# Patient Record
Sex: FEMALE | Race: WHITE
[De-identification: names, ages, dates, MRNs, and addresses within clinical notes are randomized per-mention and may not be internally consistent; named-entity substitution may affect disease eponyms.]

---

## 2017-09-29 ENCOUNTER — HOSPITAL ENCOUNTER (OUTPATIENT)
Dept: HOSPITAL 75 - REHAB | Age: 77
LOS: 1 days | Discharge: HOME | End: 2017-09-30
Attending: INTERNAL MEDICINE
Payer: MEDICARE

## 2017-09-29 DIAGNOSIS — M15.9: Primary | ICD-10-CM

## 2017-10-24 ENCOUNTER — HOSPITAL ENCOUNTER (OUTPATIENT)
Dept: HOSPITAL 75 - REHAB | Age: 77
LOS: 35 days | Discharge: HOME | End: 2017-11-28
Attending: INTERNAL MEDICINE
Payer: MEDICARE

## 2017-10-24 DIAGNOSIS — M15.9: Primary | ICD-10-CM

## 2021-03-10 ENCOUNTER — HOSPITAL ENCOUNTER (OUTPATIENT)
Dept: HOSPITAL 75 - CARD | Age: 81
End: 2021-03-10
Attending: NURSE PRACTITIONER
Payer: MEDICARE

## 2021-03-10 DIAGNOSIS — R00.2: Primary | ICD-10-CM

## 2021-03-10 PROCEDURE — 93005 ELECTROCARDIOGRAM TRACING: CPT

## 2021-04-14 ENCOUNTER — HOSPITAL ENCOUNTER (OUTPATIENT)
Dept: HOSPITAL 75 - LABNPT | Age: 81
End: 2021-04-14
Attending: INTERNAL MEDICINE
Payer: MEDICARE

## 2021-04-14 DIAGNOSIS — Z20.822: Primary | ICD-10-CM

## 2021-04-14 PROCEDURE — 87635 SARS-COV-2 COVID-19 AMP PRB: CPT

## 2021-06-25 ENCOUNTER — HOSPITAL ENCOUNTER (OUTPATIENT)
Dept: HOSPITAL 75 - SLEEP | Age: 81
End: 2021-06-25
Attending: NURSE PRACTITIONER
Payer: MEDICARE

## 2021-06-25 DIAGNOSIS — Z20.822: ICD-10-CM

## 2021-06-25 DIAGNOSIS — I49.9: ICD-10-CM

## 2021-06-25 DIAGNOSIS — G47.10: ICD-10-CM

## 2021-06-25 DIAGNOSIS — G47.33: Primary | ICD-10-CM

## 2021-06-25 PROCEDURE — 87635 SARS-COV-2 COVID-19 AMP PRB: CPT

## 2022-02-07 ENCOUNTER — HOSPITAL ENCOUNTER (OUTPATIENT)
Dept: HOSPITAL 75 - ENDO | Age: 82
Discharge: HOME | End: 2022-02-07
Attending: INTERNAL MEDICINE
Payer: MEDICARE

## 2022-02-07 VITALS — DIASTOLIC BLOOD PRESSURE: 60 MMHG | SYSTOLIC BLOOD PRESSURE: 127 MMHG

## 2022-02-07 VITALS — HEIGHT: 62.99 IN | WEIGHT: 140.21 LBS | BODY MASS INDEX: 24.84 KG/M2

## 2022-02-07 VITALS — SYSTOLIC BLOOD PRESSURE: 141 MMHG | DIASTOLIC BLOOD PRESSURE: 68 MMHG

## 2022-02-07 VITALS — DIASTOLIC BLOOD PRESSURE: 78 MMHG | SYSTOLIC BLOOD PRESSURE: 166 MMHG

## 2022-02-07 VITALS — SYSTOLIC BLOOD PRESSURE: 167 MMHG | DIASTOLIC BLOOD PRESSURE: 67 MMHG

## 2022-02-07 VITALS — DIASTOLIC BLOOD PRESSURE: 60 MMHG | SYSTOLIC BLOOD PRESSURE: 129 MMHG

## 2022-02-07 DIAGNOSIS — Z79.899: ICD-10-CM

## 2022-02-07 DIAGNOSIS — K31.89: ICD-10-CM

## 2022-02-07 DIAGNOSIS — K22.2: ICD-10-CM

## 2022-02-07 DIAGNOSIS — K21.00: Primary | ICD-10-CM

## 2022-02-07 DIAGNOSIS — Z20.822: ICD-10-CM

## 2022-02-07 DIAGNOSIS — E78.5: ICD-10-CM

## 2022-02-07 DIAGNOSIS — K44.9: ICD-10-CM

## 2022-02-07 PROCEDURE — 88305 TISSUE EXAM BY PATHOLOGIST: CPT

## 2022-02-07 PROCEDURE — 87636 SARSCOV2 & INF A&B AMP PRB: CPT

## 2022-02-07 NOTE — ANESTHESIA-GENERAL POST-OP
MAC


Patient Condition


Mental Status/LOC:  Same as Preop


Cardiovascular:  Satisfactory


Nausea/Vomiting:  Absent


Respiratory:  Satisfactory


Pain:  Controlled


Complications:  Absent





Post Op Complications


Complications


None





Follow Up Care/Instructions


Patient Instructions


None needed.





Anesthesiology Discharge Order


Discharge Order


Patient is doing well, no complaints, stable vital signs, no apparent adverse 

anesthesia problems.   


No complications reported per nursing.











EL BELL CRNA               Feb 7, 2022 16:20

## 2022-02-08 NOTE — OPERATIVE REPORT
DATE OF SERVICE:  02/07/2022



EGD SUMMARY



INDICATION FOR THE PROCEDURE:

EGD was performed for evaluation of dysphagia.



DESCRIPTION OF PROCEDURE:

The patient was placed in the left lateral decubitus position.  The endoscope

was inserted in the oral cavity and under direct visualization, esophagus was

intubated.  Endoscope was passed down the esophagus through stomach and second

portion of the duodenum.  Careful inspection was made as the endoscope was

withdrawn.



FINDINGS:

The posterior pharynx, arytenoid aperture, epiglottis, and true and false vocal

folds were unremarkable to digital inspection.  Proximal and mid esophagus were

unremarkable.  There is some tortuosity of the distal esophagus.  There is a

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

ulceration.  Just proximal to this, there did appear to be some increased tone

of the lower esophagus, especially in light of underlying Diprivan-based

anesthesia.  A small hiatal hernia was present.  The cardia and fundus were

unremarkable.  There is some mild antral erythema without evidence for erosion

or ulceration.  The pylorus, the pyloric channel, the duodenal bulb and second

portion of duodenum were unremarkable.



ASSESSMENT AND PLAN:

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  The
patient

subsequently underwent balloon dilatation using the 20 mm balloon dilators. 

This did notably increase the circumference of the distal esophagus.  Biopsies

from the GE junction were obtained.  There was some striation in the

distal esophagus, so a biopsy was obtained and submitted for histopathology

evaluation, specifically for evaluation for eosinophilic esophagitis.  Advised

continued liquid diet for the next 24 hours and then advancing to soft solids. 

I did advise proton pump inhibitor therapy for now and we will initiate

omeprazole 20 mg q.a.m. daily.  Advised follow up with Doris in several weeks

to monitor progress.  I did discuss the likelihood of recurrence as has been the

case in the past with a need for repeat dilatation for onset of dysphagia in the
future.





Job ID: [id number]

DocumentID: [id number]

Dictated Date:  02/07/2022 16:31:49

Transcription Date: 02/08/2022 00:58:47

Dictated By: SILVER BURNETT MD

[facility]

## 2022-03-29 ENCOUNTER — HOSPITAL ENCOUNTER (OUTPATIENT)
Dept: HOSPITAL 75 - PREOP | Age: 82
End: 2022-03-29
Attending: OTOLARYNGOLOGY
Payer: MEDICARE

## 2022-03-29 VITALS — BODY MASS INDEX: 23.11 KG/M2 | WEIGHT: 135.36 LBS | HEIGHT: 64.02 IN

## 2022-03-29 DIAGNOSIS — Z01.810: Primary | ICD-10-CM

## 2022-03-29 DIAGNOSIS — Z01.812: ICD-10-CM

## 2022-03-29 DIAGNOSIS — K13.0: ICD-10-CM

## 2022-03-29 LAB
BUN/CREAT SERPL: 15
CALCIUM SERPL-MCNC: 9.3 MG/DL (ref 8.5–10.1)
CHLORIDE SERPL-SCNC: 105 MMOL/L (ref 98–107)
CO2 SERPL-SCNC: 30 MMOL/L (ref 21–32)
CREAT SERPL-MCNC: 0.75 MG/DL (ref 0.6–1.3)
GFR SERPLBLD BASED ON 1.73 SQ M-ARVRAT: 79 ML/MIN
GLUCOSE SERPL-MCNC: 90 MG/DL (ref 70–105)
HCT VFR BLD CALC: 43 % (ref 35–52)
HGB BLD-MCNC: 14.2 G/DL (ref 11.5–16)
MCH RBC QN AUTO: 31 PG (ref 25–34)
MCHC RBC AUTO-ENTMCNC: 33 G/DL (ref 32–36)
MCV RBC AUTO: 95 FL (ref 80–99)
PLATELET # BLD: 203 10^3/UL (ref 130–400)
PMV BLD AUTO: 11.4 FL (ref 9–12.2)
POTASSIUM SERPL-SCNC: 3.9 MMOL/L (ref 3.6–5)
SODIUM SERPL-SCNC: 142 MMOL/L (ref 135–145)
WBC # BLD AUTO: 6.3 10^3/UL (ref 4.3–11)

## 2022-03-29 PROCEDURE — 93005 ELECTROCARDIOGRAM TRACING: CPT

## 2022-03-29 PROCEDURE — 36415 COLL VENOUS BLD VENIPUNCTURE: CPT

## 2022-03-29 PROCEDURE — 71046 X-RAY EXAM CHEST 2 VIEWS: CPT

## 2022-03-29 PROCEDURE — 85027 COMPLETE CBC AUTOMATED: CPT

## 2022-03-29 PROCEDURE — 87081 CULTURE SCREEN ONLY: CPT

## 2022-03-29 PROCEDURE — 80048 BASIC METABOLIC PNL TOTAL CA: CPT

## 2022-03-29 NOTE — DIAGNOSTIC IMAGING REPORT
INDICATION: 

Preop for left hip surgery.



TIME OF EXAM: 

2:18 PM.



COMPARISON: 

No prior studies are available for comparison.



FINDINGS: 

The heart size is normal. The pulmonary vascularity is

unremarkable. The lungs are clear. No infiltrate, effusion, or

pneumothorax is detected.



IMPRESSION: 

No acute cardiopulmonary process is detected.



Dictated by: 



  Dictated on workstation # [id number]

## 2022-03-31 ENCOUNTER — HOSPITAL ENCOUNTER (OUTPATIENT)
Dept: HOSPITAL 75 - SDC | Age: 82
Discharge: HOME | End: 2022-03-31
Attending: OTOLARYNGOLOGY
Payer: MEDICARE

## 2022-03-31 VITALS — SYSTOLIC BLOOD PRESSURE: 123 MMHG | DIASTOLIC BLOOD PRESSURE: 57 MMHG

## 2022-03-31 VITALS — DIASTOLIC BLOOD PRESSURE: 53 MMHG | SYSTOLIC BLOOD PRESSURE: 124 MMHG

## 2022-03-31 VITALS — DIASTOLIC BLOOD PRESSURE: 50 MMHG | SYSTOLIC BLOOD PRESSURE: 93 MMHG

## 2022-03-31 VITALS — SYSTOLIC BLOOD PRESSURE: 106 MMHG | DIASTOLIC BLOOD PRESSURE: 57 MMHG

## 2022-03-31 VITALS — HEIGHT: 63.78 IN | WEIGHT: 135.36 LBS | BODY MASS INDEX: 23.4 KG/M2

## 2022-03-31 VITALS — DIASTOLIC BLOOD PRESSURE: 55 MMHG | SYSTOLIC BLOOD PRESSURE: 103 MMHG

## 2022-03-31 VITALS — DIASTOLIC BLOOD PRESSURE: 70 MMHG | SYSTOLIC BLOOD PRESSURE: 148 MMHG

## 2022-03-31 VITALS — DIASTOLIC BLOOD PRESSURE: 68 MMHG | SYSTOLIC BLOOD PRESSURE: 144 MMHG

## 2022-03-31 VITALS — SYSTOLIC BLOOD PRESSURE: 140 MMHG | DIASTOLIC BLOOD PRESSURE: 82 MMHG

## 2022-03-31 VITALS — DIASTOLIC BLOOD PRESSURE: 89 MMHG | SYSTOLIC BLOOD PRESSURE: 136 MMHG

## 2022-03-31 DIAGNOSIS — D23.0: Primary | ICD-10-CM

## 2022-03-31 DIAGNOSIS — Z79.899: ICD-10-CM

## 2022-03-31 DIAGNOSIS — I45.89: ICD-10-CM

## 2022-03-31 DIAGNOSIS — K21.9: ICD-10-CM

## 2022-03-31 PROCEDURE — 87081 CULTURE SCREEN ONLY: CPT

## 2022-03-31 PROCEDURE — 93005 ELECTROCARDIOGRAM TRACING: CPT

## 2022-03-31 RX ADMIN — SODIUM CHLORIDE, SODIUM LACTATE, POTASSIUM CHLORIDE, AND CALCIUM CHLORIDE PRN MLS/HR: 600; 310; 30; 20 INJECTION, SOLUTION INTRAVENOUS at 06:35

## 2022-03-31 RX ADMIN — SODIUM CHLORIDE, SODIUM LACTATE, POTASSIUM CHLORIDE, AND CALCIUM CHLORIDE PRN MLS/HR: 600; 310; 30; 20 INJECTION, SOLUTION INTRAVENOUS at 08:35

## 2022-03-31 NOTE — PROGRESS NOTE-POST OPERATIVE
Post-Operative Progess Note


Surgeon (s)/Assistant (s)


Surgeon


VALDEMAR GUILLORY MD


Assistant


n/a





Pre-Operative Diagnosis


LesftLower Lip Lesion





Post-Operative Diagnosis


same





Post-Op Procedure Note


Date of Procedure:  Mar 31, 2022


Name of Procedure Performed:  


Excisoion of Left Loer Lip Leisoin with Repair


Description & Findings


Description and Findings:





n/a


Anesthesia Type


get


Estimated Blood Loss


minimal


Packing


none.


Specimen(s) collected/removed


left lower lip lesion











VALDEMAR GUILLORY MD             Mar 31, 2022 07:10

## 2022-03-31 NOTE — ANESTHESIA-GENERAL POST-OP
General


Patient Condition


Mental Status/LOC:  Same as Preop


Cardiovascular:  Satisfactory


Nausea/Vomiting:  Absent


Respiratory:  Satisfactory


Pain:  Controlled


Complications:  Absent





Post Op Complications


Complications


None





Follow Up Care/Instructions


Patient Instructions


None needed.





Anesthesia/Patient Condition


Patient Condition


Patient is doing well, no complaints, stable vital signs, no apparent adverse 

anesthesia problems.   


No complications reported per nursing.











SANDRO WORTHY CRNA          Mar 31, 2022 09:42

## 2022-03-31 NOTE — PROGRESS NOTE-PRE OPERATIVE
Pre-Operative Progress Note


H&P Reviewed


The H&P was reviewed, patient examined and no changes noted.


Date Seen by Provider:  Mar 31, 2022


Time Seen by Provider:  06:30


Date H&P Reviewed:  Mar 31, 2022


Time H&P Reviewed:  06:30


Pre-Operative Diagnosis:  LesftLower Lip Lesion











VALDEMAR GUILLORY MD             Mar 31, 2022 07:07

## 2023-05-26 ENCOUNTER — HOSPITAL ENCOUNTER (OUTPATIENT)
Dept: HOSPITAL 75 - CARD | Age: 83
End: 2023-05-26
Attending: INTERNAL MEDICINE
Payer: MEDICARE

## 2023-05-26 DIAGNOSIS — I10: Primary | ICD-10-CM

## 2023-05-26 PROCEDURE — 93306 TTE W/DOPPLER COMPLETE: CPT
